# Patient Record
Sex: FEMALE | Race: OTHER | HISPANIC OR LATINO | URBAN - METROPOLITAN AREA
[De-identification: names, ages, dates, MRNs, and addresses within clinical notes are randomized per-mention and may not be internally consistent; named-entity substitution may affect disease eponyms.]

---

## 2019-05-10 ENCOUNTER — EMERGENCY (EMERGENCY)
Facility: HOSPITAL | Age: 74
LOS: 1 days | Discharge: HOME | End: 2019-05-10
Attending: EMERGENCY MEDICINE | Admitting: EMERGENCY MEDICINE
Payer: MEDICARE

## 2019-05-10 VITALS
WEIGHT: 132.06 LBS | TEMPERATURE: 98 F | SYSTOLIC BLOOD PRESSURE: 132 MMHG | OXYGEN SATURATION: 100 % | HEART RATE: 85 BPM | RESPIRATION RATE: 20 BRPM | DIASTOLIC BLOOD PRESSURE: 62 MMHG | HEIGHT: 61 IN

## 2019-05-10 DIAGNOSIS — R06.02 SHORTNESS OF BREATH: ICD-10-CM

## 2019-05-10 DIAGNOSIS — Z79.52 LONG TERM (CURRENT) USE OF SYSTEMIC STEROIDS: ICD-10-CM

## 2019-05-10 DIAGNOSIS — J45.901 UNSPECIFIED ASTHMA WITH (ACUTE) EXACERBATION: ICD-10-CM

## 2019-05-10 PROCEDURE — 99284 EMERGENCY DEPT VISIT MOD MDM: CPT | Mod: 25

## 2019-05-10 NOTE — ED ADULT TRIAGE NOTE - ARRIVAL INFO ADDITIONAL COMMENTS
c.o sob since yesterday. states she flew in from [place] last night. states she has hx of asthma. 2 albuterol and 1 combi vent administered by EMS. hep lock to left ac #18 by ems. pt is speaking in clear, complete sentences. states she feels better after meds. denies any chest pain, fever/chills. slight wheezing noted to right lower lobes.

## 2019-05-11 VITALS
OXYGEN SATURATION: 97 % | TEMPERATURE: 99 F | SYSTOLIC BLOOD PRESSURE: 119 MMHG | RESPIRATION RATE: 22 BRPM | HEART RATE: 98 BPM | DIASTOLIC BLOOD PRESSURE: 76 MMHG

## 2019-05-11 PROCEDURE — 94640 AIRWAY INHALATION TREATMENT: CPT

## 2019-05-11 PROCEDURE — 99283 EMERGENCY DEPT VISIT LOW MDM: CPT | Mod: 25

## 2019-05-11 RX ORDER — ALBUTEROL 90 UG/1
2.5 AEROSOL, METERED ORAL ONCE
Refills: 0 | Status: COMPLETED | OUTPATIENT
Start: 2019-05-11 | End: 2019-05-11

## 2019-05-11 RX ORDER — ALBUTEROL 90 UG/1
1 AEROSOL, METERED ORAL ONCE
Refills: 0 | Status: COMPLETED | OUTPATIENT
Start: 2019-05-11 | End: 2019-05-11

## 2019-05-11 RX ADMIN — Medication 60 MILLIGRAM(S): at 00:29

## 2019-05-11 RX ADMIN — ALBUTEROL 2.5 MILLIGRAM(S): 90 AEROSOL, METERED ORAL at 00:29

## 2019-05-11 RX ADMIN — ALBUTEROL 1 PUFF(S): 90 AEROSOL, METERED ORAL at 00:29

## 2019-05-11 NOTE — ED PROVIDER NOTE - CLINICAL SUMMARY MEDICAL DECISION MAKING FREE TEXT BOX
here w/ acute asthma exacerbation in setting of no albuterol at home. already feeling better in ED after EMS neb, but still wheezing so one more given to good effect. started steroid burst. inhaler given to pt in ED to take home. DC home in NAD with strict return precautions given.

## 2019-05-11 NOTE — ED PROVIDER NOTE - OBJECTIVE STATEMENT
74yo F hx of asthma here w/ complaint of asthma exacerbation that started today. pt had no albuterol, symptoms got worse and worse so called EMS. s/p duonebs w/ ems to good effect, feeling better now but not fully back to baseline. no recent uri or fevers. no cough.

## 2019-05-11 NOTE — ED ADULT NURSE NOTE - OBJECTIVE STATEMENT
Received a 73 year old female with a chief complaint of SOB. Patient reports that she needs an inhaler.

## 2019-05-11 NOTE — ED ADULT NURSE NOTE - NSIMPLEMENTINTERV_GEN_ALL_ED
Implemented All Universal Safety Interventions:  [place] to call system. Call bell, personal items and telephone within reach. Instruct patient to call for assistance. Room bathroom lighting operational. Non-slip footwear when patient is off stretcher. Physically safe environment: no spills, clutter or unnecessary equipment. Stretcher in lowest position, wheels locked, appropriate side rails in place.